# Patient Record
Sex: FEMALE | Race: WHITE | NOT HISPANIC OR LATINO | Employment: FULL TIME | URBAN - METROPOLITAN AREA
[De-identification: names, ages, dates, MRNs, and addresses within clinical notes are randomized per-mention and may not be internally consistent; named-entity substitution may affect disease eponyms.]

---

## 2017-05-04 ENCOUNTER — ALLSCRIPTS OFFICE VISIT (OUTPATIENT)
Dept: OTHER | Facility: OTHER | Age: 52
End: 2017-05-04

## 2017-09-07 DIAGNOSIS — Z12.31 ENCOUNTER FOR SCREENING MAMMOGRAM FOR MALIGNANT NEOPLASM OF BREAST: ICD-10-CM

## 2018-01-13 VITALS
HEIGHT: 63 IN | SYSTOLIC BLOOD PRESSURE: 130 MMHG | DIASTOLIC BLOOD PRESSURE: 80 MMHG | BODY MASS INDEX: 20.39 KG/M2 | WEIGHT: 115.06 LBS

## 2018-05-29 ENCOUNTER — ANNUAL EXAM (OUTPATIENT)
Dept: OBGYN CLINIC | Facility: CLINIC | Age: 53
End: 2018-05-29
Payer: COMMERCIAL

## 2018-05-29 VITALS
DIASTOLIC BLOOD PRESSURE: 80 MMHG | HEIGHT: 62 IN | BODY MASS INDEX: 21.35 KG/M2 | SYSTOLIC BLOOD PRESSURE: 122 MMHG | WEIGHT: 116 LBS

## 2018-05-29 DIAGNOSIS — Z12.31 ENCOUNTER FOR SCREENING MAMMOGRAM FOR MALIGNANT NEOPLASM OF BREAST: ICD-10-CM

## 2018-05-29 DIAGNOSIS — Z01.419 ENCOUNTER FOR GYNECOLOGICAL EXAMINATION WITHOUT ABNORMAL FINDING: Primary | ICD-10-CM

## 2018-05-29 PROBLEM — K21.9 GERD (GASTROESOPHAGEAL REFLUX DISEASE): Status: ACTIVE | Noted: 2018-05-29

## 2018-05-29 PROBLEM — K44.9 HIATAL HERNIA: Status: ACTIVE | Noted: 2018-05-08

## 2018-05-29 PROBLEM — H40.9 GLAUCOMA: Status: ACTIVE | Noted: 2018-05-29

## 2018-05-29 PROBLEM — I10 HYPERTENSION: Status: ACTIVE | Noted: 2018-05-29

## 2018-05-29 PROBLEM — J45.909 ASTHMA: Status: ACTIVE | Noted: 2018-03-16

## 2018-05-29 PROCEDURE — S0612 ANNUAL GYNECOLOGICAL EXAMINA: HCPCS | Performed by: OBSTETRICS & GYNECOLOGY

## 2018-05-29 RX ORDER — DESONIDE 0.5 MG/G
GEL TOPICAL
COMMUNITY
Start: 2018-04-26

## 2018-05-29 NOTE — PROGRESS NOTES
Assessment/Plan:    Pap due 2019  Mammo overdue  Discussed finding of cystocele on exam - not having symptoms  Discussed kegel exercises, and symptoms to be aware of  Can call with concerns or problems  Subjective:      Patient ID: Maco Powell is a 50 y o  female  Yearly exam   SAB1  LMP: 2018  Contraception: Vasectomy  PAP: 2014 neg HPV neg  Mammo: 2016 Birad #2  Colonoscopy:  as per pt neg    Fatmata Marroquin is doing well  Reports only change to her PMHx/PSHx is glaucoma, worse in her left eye  She was getting her menses regularly until Feb, and has not bled since  Having some hot flashes around when she would normally get her periods  Denies bladder or bowel problems  No breast concerns  No vaginal dryness or discharge, no pelvic pain or dyspareunia  The following portions of the patient's history were reviewed and updated as appropriate: allergies, current medications, past family history, past medical history, past social history, past surgical history and problem list     Review of Systems   Gastrointestinal: Negative for abdominal pain, constipation and diarrhea  Genitourinary: Negative for decreased urine volume, difficulty urinating, dyspareunia, menstrual problem, pelvic pain, vaginal bleeding, vaginal discharge and vaginal pain  Objective:    /80   Ht 5' 1 5" (1 562 m)   Wt 52 6 kg (116 lb)   LMP 2018   BMI 21 56 kg/m² '       Physical Exam   Constitutional: She is oriented to person, place, and time  She appears well-developed and well-nourished  No distress  Pulmonary/Chest: No respiratory distress  Right breast exhibits no inverted nipple, no mass, no nipple discharge, no skin change and no tenderness  Left breast exhibits no inverted nipple, no mass, no nipple discharge, no skin change and no tenderness  Abdominal: Soft  There is no tenderness  Genitourinary: Uterus normal  There is no rash or lesion on the right labia   There is no rash or lesion on the left labia  Uterus is not enlarged and not tender  Cervix exhibits no motion tenderness  Right adnexum displays no mass and no tenderness  Left adnexum displays no mass and no tenderness  Genitourinary Comments: Vagina: relaxed vaginal outlet, + cystocele to hymenal ring   Neurological: She is alert and oriented to person, place, and time  Skin: Skin is warm and dry  Psychiatric: She has a normal mood and affect  Vitals reviewed

## 2018-08-13 ENCOUNTER — HOSPITAL ENCOUNTER (OUTPATIENT)
Dept: RADIOLOGY | Age: 53
Discharge: HOME | End: 2018-08-13
Payer: COMMERCIAL

## 2018-08-13 DIAGNOSIS — Z12.31 ENCOUNTER FOR SCREENING MAMMOGRAM FOR MALIGNANT NEOPLASM OF BREAST: ICD-10-CM

## 2018-08-13 PROCEDURE — 77063 BREAST TOMOSYNTHESIS BI: CPT

## 2018-08-13 PROCEDURE — 77067 SCR MAMMO BI INCL CAD: CPT

## 2019-10-23 ENCOUNTER — ANNUAL EXAM (OUTPATIENT)
Dept: OBGYN CLINIC | Facility: CLINIC | Age: 54
End: 2019-10-23
Payer: COMMERCIAL

## 2019-10-23 VITALS
SYSTOLIC BLOOD PRESSURE: 134 MMHG | HEIGHT: 62 IN | BODY MASS INDEX: 21.53 KG/M2 | WEIGHT: 117 LBS | DIASTOLIC BLOOD PRESSURE: 76 MMHG

## 2019-10-23 DIAGNOSIS — Z01.419 ENCNTR FOR GYN EXAM (GENERAL) (ROUTINE) W/O ABN FINDINGS: Primary | ICD-10-CM

## 2019-10-23 DIAGNOSIS — Z12.31 ENCOUNTER FOR SCREENING MAMMOGRAM FOR MALIGNANT NEOPLASM OF BREAST: ICD-10-CM

## 2019-10-23 DIAGNOSIS — Z11.51 SCREENING FOR HUMAN PAPILLOMAVIRUS (HPV): ICD-10-CM

## 2019-10-23 DIAGNOSIS — L90.0 LICHEN SCLEROSUS ET ATROPHICUS: ICD-10-CM

## 2019-10-23 PROCEDURE — S0612 ANNUAL GYNECOLOGICAL EXAMINA: HCPCS | Performed by: OBSTETRICS & GYNECOLOGY

## 2019-10-23 PROCEDURE — G0124 SCREEN C/V THIN LAYER BY MD: HCPCS | Performed by: PATHOLOGY

## 2019-10-23 PROCEDURE — G0145 SCR C/V CYTO,THINLAYER,RESCR: HCPCS | Performed by: PATHOLOGY

## 2019-10-23 PROCEDURE — 87624 HPV HI-RISK TYP POOLED RSLT: CPT | Performed by: OBSTETRICS & GYNECOLOGY

## 2019-10-23 RX ORDER — TRAVOPROST 0.004 %
DROPS OPHTHALMIC (EYE)
Refills: 3 | COMMUNITY
Start: 2019-08-06

## 2019-10-23 RX ORDER — CLOBETASOL PROPIONATE 0.5 MG/G
CREAM TOPICAL 2 TIMES DAILY
Qty: 30 G | Refills: 0 | Status: SHIPPED | OUTPATIENT
Start: 2019-10-23 | End: 2021-03-29 | Stop reason: ALTCHOICE

## 2019-10-23 NOTE — PROGRESS NOTES
Court Gutiérrezfaheem   1965    CC:  Yearly exam    S:  48 y o  female here for yearly exam  She is still occasionally getting her period - although will go 5-6 months between them  When she does get it - not too heavy or too long  She denies vaginal discharge, itching, odor or dryness   and monogamous  Williamsjensen [place] is 16yo (son) - oldest is 17yo and in college  Still working  Sexual activity: She is sexually active without pain, bleeding or dryness  STD testing: She does not want STD testing today  Last Pap: 2014 - Normal Cytology, Negative HPV  Last Mammo: 8/3/18 - benign  Last Colonoscopy: 2010 - 10 year recall    We reviewed ASCCP guidelines for Pap testing         Current Outpatient Medications:     desonide (DESONATE) 0 05 % gel, APPLY TO HANDS EVERY DAY AS NEEDED, Disp: , Rfl:     PROAIR  (90 Base) MCG/ACT inhaler, 2 PUFFS EVERY 4 HOURS AS NEEDED  MAY USE 2 PUFFS 20-30 MINUTES BEFORE PHYSICAL EXERTION, Disp: , Rfl: 3    TRAVATAN Z 0 004 % ophthalmic solution, TAKE 1 DROP AT BEDTIME INTO BOTH EYES BRAND NECESSARY, Disp: , Rfl: 3    clobetasol (TEMOVATE) 0 05 % cream, Apply topically 2 (two) times a day, Disp: 30 g, Rfl: 0  Social History     Socioeconomic History    Marital status: /Civil Union     Spouse name: Not on file    Number of children: 3    Years of education: Not on file    Highest education level: Not on file   Occupational History    Not on file   Social Needs    Financial resource strain: Not on file    Food insecurity:     Worry: Not on file     Inability: Not on file    Transportation needs:     Medical: Not on file     Non-medical: Not on file   Tobacco Use    Smoking status: Never Smoker    Smokeless tobacco: Never Used   Substance and Sexual Activity    Alcohol use: Yes     Frequency: 2-4 times a month     Drinks per session: 1 or 2     Binge frequency: Never    Drug use: No    Sexual activity: Yes     Partners: Male     Birth control/protection: Male Sterilization   Lifestyle    Physical activity:     Days per week: Not on file     Minutes per session: Not on file    Stress: Not on file   Relationships    Social connections:     Talks on phone: Not on file     Gets together: Not on file     Attends Episcopalian service: Not on file     Active member of club or organization: Not on file     Attends meetings of clubs or organizations: Not on file     Relationship status: Not on file    Intimate partner violence:     Fear of current or ex partner: Not on file     Emotionally abused: Not on file     Physically abused: Not on file     Forced sexual activity: Not on file   Other Topics Concern    Not on file   Social History Narrative    Always uses seat belt    Caffeine use    No preference on Episcopalian beliefs     Partner had vasectomy      Family History   Problem Relation Age of Onset    Diabetes Mother     Osteoporosis Mother     Lung cancer Father     Skin cancer Brother     Breast cancer Maternal Aunt      Past Medical History:   Diagnosis Date    Asthma     Cyst, breast, unspecified laterality     [place]   3/20/14   [place]  Jorge Alberto Cha 4/11/16    Fibroadenoma of breast     Premenopausal menorrhagia     [place]  Jorge Alberto Cha 2/17/14   [place]    4/7/15         Review of Systems   Respiratory: Negative  Cardiovascular: Negative  Gastrointestinal: Negative for constipation and diarrhea  Genitourinary: Negative for difficulty urinating, pelvic pain, vaginal bleeding, vaginal discharge, itching or odor  O:  Blood pressure 134/76, height 5' 2" (1 575 m), weight 53 1 kg (117 lb), last menstrual period 09/16/2019  Patient appears well and is not in distress  Neck is supple without masses  Breasts are symmetrical without mass, tenderness, nipple discharge, skin changes or adenopathy  Abdomen is soft and nontender without masses     External genitals + white discoloration, loss normal anatomy near clitoris  Urethral meatus and urethra are normal  Bladder is normal to palpation  Vagina is + cystocele   Cervix is normal without discharge or lesion  Uterus is normal, mobile, nontender without palpable mass  Adnexa are normal, nontender, without palpable mass  A:  Yearly exam      P:   Pap & HPV collected  Mammo slip given   Colonoscopy due 2285   Lichen Sclerosis - patient does admit to occasional itching, discussed use clobetasol   Cystocele asymptomatic  RTO one year for yearly exam or sooner as needed

## 2019-10-25 LAB
HPV HR 12 DNA CVX QL NAA+PROBE: NEGATIVE
HPV16 DNA CVX QL NAA+PROBE: NEGATIVE
HPV18 DNA CVX QL NAA+PROBE: NEGATIVE

## 2019-10-30 LAB
LAB AP GYN PRIMARY INTERPRETATION: NORMAL
LAB AP LMP: NORMAL
Lab: NORMAL
PATH INTERP SPEC-IMP: NORMAL

## 2021-03-25 ENCOUNTER — PREPPED CHART (OUTPATIENT)
Dept: URBAN - METROPOLITAN AREA CLINIC 6 | Facility: CLINIC | Age: 56
End: 2021-03-25

## 2021-03-29 ENCOUNTER — ANNUAL EXAM (OUTPATIENT)
Dept: OBGYN CLINIC | Facility: CLINIC | Age: 56
End: 2021-03-29
Payer: COMMERCIAL

## 2021-03-29 VITALS
HEIGHT: 62 IN | WEIGHT: 123 LBS | SYSTOLIC BLOOD PRESSURE: 140 MMHG | DIASTOLIC BLOOD PRESSURE: 64 MMHG | BODY MASS INDEX: 22.63 KG/M2

## 2021-03-29 DIAGNOSIS — Z01.419 ENCOUNTER FOR GYNECOLOGICAL EXAMINATION WITHOUT ABNORMAL FINDING: Primary | ICD-10-CM

## 2021-03-29 DIAGNOSIS — Z12.31 ENCOUNTER FOR SCREENING MAMMOGRAM FOR MALIGNANT NEOPLASM OF BREAST: ICD-10-CM

## 2021-03-29 PROCEDURE — 99396 PREV VISIT EST AGE 40-64: CPT | Performed by: OBSTETRICS & GYNECOLOGY

## 2021-03-29 RX ORDER — BRIMONIDINE TARTRATE 0.1 %
DROPS OPHTHALMIC (EYE)
COMMUNITY
Start: 2021-03-25

## 2021-03-29 RX ORDER — LOSARTAN POTASSIUM 25 MG/1
12.5 TABLET ORAL DAILY
COMMUNITY
Start: 2021-02-05

## 2021-03-29 NOTE — PROGRESS NOTES
Damaris Martínez  1965      CC:  Yearly exam    S:  50 y o  female here for yearly exam  LMP October 2020  Prior to that was maybe April  Reviewed definitions of menopause, and when bleeding would become irregular  No urinary, bowel, breast concerns  Diagnosed with lichen last visit - has not had any flares/issues with this  Family has been healthy  She has gotten her first dose of the COVID vaccine! Since last visit was started on Losartan for HTN  Sexual activity: She is sexually active without pain, bleeding or dryness  Contraception: She uses vasectomy  for contraception  Last Pap 10/23/19 - Normal, Neg HPV  Last Mammo  8/13/18 - 3D, benign  Last Colonosocpy - has not had, but has referral     We reviewed ASCCP guidelines for Pap testing today  Family hx of breast cancer: M   Aunt  Family hx of ovarian cancer: no  Family hx of colon cancer: no      Current Outpatient Medications:     Alphagan P 0 1 %, , Disp: , Rfl:     desonide (DESONATE) 0 05 % gel, APPLY TO HANDS EVERY DAY AS NEEDED, Disp: , Rfl:     losartan (COZAAR) 25 mg tablet, Take 12 5 mg by mouth daily, Disp: , Rfl:     PROAIR  (90 Base) MCG/ACT inhaler, 2 PUFFS EVERY 4 HOURS AS NEEDED  MAY USE 2 PUFFS 20-30 MINUTES BEFORE PHYSICAL EXERTION, Disp: , Rfl: 3    TRAVATAN Z 0 004 % ophthalmic solution, TAKE 1 DROP AT BEDTIME INTO BOTH EYES BRAND NECESSARY, Disp: , Rfl: 3    clobetasol (TEMOVATE) 0 05 % cream, Apply topically 2 (two) times a day (Patient not taking: Reported on 3/29/2021), Disp: 30 g, Rfl: 0  Social History     Socioeconomic History    Marital status: /Civil Union     Spouse name: Not on file    Number of children: 3    Years of education: Not on file    Highest education level: Not on file   Occupational History    Not on file   Social Needs    Financial resource strain: Not on file    Food insecurity     Worry: Not on file     Inability: Not on file   [employer] needs Medical: Not on file     Non-medical: Not on file   Tobacco Use    Smoking status: Never Smoker    Smokeless tobacco: Never Used   Substance and Sexual Activity    Alcohol use: Yes     Frequency: 2-4 times a month     Drinks per session: 1 or 2     Binge frequency: Never    Drug use: No    Sexual activity: Yes     Partners: Male     Birth control/protection: Male Sterilization   Lifestyle    Physical activity     Days per week: Not on file     Minutes per session: Not on file    Stress: Not on file   Relationships    Social connections     Talks on phone: Not on file     Gets together: Not on file     Attends Mormonism service: Not on file     Active member of club or organization: Not on file     Attends meetings of clubs or organizations: Not on file     Relationship status: Not on file    Intimate partner violence     Fear of current or ex partner: Not on file     Emotionally abused: Not on file     Physically abused: Not on file     Forced sexual activity: Not on file   Other Topics Concern    Not on file   Social History Narrative    Always uses seat belt    Caffeine use    No preference on Mormonism beliefs     Partner had vasectomy      Family History   Problem Relation Age of Onset    Diabetes Mother     Osteoporosis Mother     Lung cancer Father     Skin cancer Brother     Breast cancer Maternal Aunt     Heart attack Sister     Heart attack Brother       Past Medical History:   Diagnosis Date    Asthma     Cyst, breast, unspecified laterality     [place]   3/20/14   [place]  Francella Crooked Francella Crooked 4/11/16    Fibroadenoma of breast     Hypertension     Premenopausal menorrhagia     [place]  Francella Crooked Francella Crooked 2/17/14   [place]    4/7/15         Review of Systems   Respiratory: Negative  Cardiovascular: Negative  Gastrointestinal: Negative for constipation and diarrhea  Genitourinary: Negative for difficulty urinating, pelvic pain, vaginal bleeding, vaginal discharge, itching or odor      O:  Blood pressure 140/64, height 5' 2" (1 575 m), weight 55 8 kg (123 lb)  Patient appears well and is not in distress  Breasts are symmetrical without mass, tenderness, nipple discharge, skin changes or adenopathy  Abdomen is soft and nontender without masses  External genitals  - white thinning, loss normal architecture around clitoris  Urethral meatus and urethra are normal  Bladder is normal to palpation  Vagina + cystocele, mild atrophy  Cervix is normal without discharge or lesion  Uterus is normal, mobile, nontender without palpable mass  Adnexa are normal, nontender, without palpable mass  A:  Yearly exam      P:   Pap up to date     Mammo slip provided    RTO one year for yearly exam or sooner as needed

## 2021-04-08 DIAGNOSIS — Z23 ENCOUNTER FOR IMMUNIZATION: ICD-10-CM

## 2021-11-05 ENCOUNTER — IOP CHECK (OUTPATIENT)
Dept: URBAN - METROPOLITAN AREA CLINIC 6 | Facility: CLINIC | Age: 56
End: 2021-11-05

## 2021-11-05 DIAGNOSIS — H25.813: ICD-10-CM

## 2021-11-05 DIAGNOSIS — H04.123: ICD-10-CM

## 2021-11-05 DIAGNOSIS — H11.041: ICD-10-CM

## 2021-11-05 DIAGNOSIS — H40.1230: ICD-10-CM

## 2021-11-05 PROCEDURE — G8427 DOCREV CUR MEDS BY ELIG CLIN: HCPCS

## 2021-11-05 PROCEDURE — 92083 EXTENDED VISUAL FIELD XM: CPT

## 2021-11-05 PROCEDURE — 1036F TOBACCO NON-USER: CPT

## 2021-11-05 PROCEDURE — 92012 INTRM OPH EXAM EST PATIENT: CPT

## 2021-11-05 ASSESSMENT — VISUAL ACUITY
OS_CC: 20/25+2
OU_CC: J1
OD_CC: 20/25+2

## 2021-11-05 ASSESSMENT — TONOMETRY
OD_IOP_MMHG: 14
OD_IOP_MMHG: 14
OS_IOP_MMHG: 16
OS_IOP_MMHG: 14

## 2021-11-17 ENCOUNTER — TELEPHONE (OUTPATIENT)
Dept: OBGYN CLINIC | Facility: CLINIC | Age: 56
End: 2021-11-17

## 2023-10-01 ENCOUNTER — HOSPITAL ENCOUNTER (EMERGENCY)
Facility: HOSPITAL | Age: 58
Discharge: HOME | End: 2023-10-01
Attending: EMERGENCY MEDICINE
Payer: COMMERCIAL

## 2023-10-01 VITALS
RESPIRATION RATE: 18 BRPM | HEART RATE: 65 BPM | TEMPERATURE: 98.7 F | SYSTOLIC BLOOD PRESSURE: 154 MMHG | DIASTOLIC BLOOD PRESSURE: 74 MMHG | OXYGEN SATURATION: 100 %

## 2023-10-01 DIAGNOSIS — T63.441A ALLERGIC REACTION TO BEE STING: Primary | ICD-10-CM

## 2023-10-01 PROCEDURE — 99284 EMERGENCY DEPT VISIT MOD MDM: CPT | Performed by: EMERGENCY MEDICINE

## 2023-10-01 PROCEDURE — 99282 EMERGENCY DEPT VISIT SF MDM: CPT

## 2023-10-01 PROCEDURE — 96375 TX/PRO/DX INJ NEW DRUG ADDON: CPT

## 2023-10-01 PROCEDURE — 96374 THER/PROPH/DIAG INJ IV PUSH: CPT

## 2023-10-01 RX ORDER — LORATADINE 10 MG/1
10 TABLET ORAL 2 TIMES DAILY PRN
Qty: 20 TABLET | Refills: 0 | Status: SHIPPED | OUTPATIENT
Start: 2023-10-01

## 2023-10-01 RX ORDER — EPINEPHRINE 0.3 MG/.3ML
0.3 INJECTION SUBCUTANEOUS ONCE
Qty: 0.6 ML | Refills: 0 | Status: SHIPPED | OUTPATIENT
Start: 2023-10-01 | End: 2023-10-01

## 2023-10-01 RX ORDER — FAMOTIDINE 10 MG/ML
20 INJECTION, SOLUTION INTRAVENOUS ONCE
Status: COMPLETED | OUTPATIENT
Start: 2023-10-01 | End: 2023-10-01

## 2023-10-01 RX ORDER — KETOROLAC TROMETHAMINE 30 MG/ML
15 INJECTION, SOLUTION INTRAMUSCULAR; INTRAVENOUS ONCE
Status: COMPLETED | OUTPATIENT
Start: 2023-10-01 | End: 2023-10-01

## 2023-10-01 RX ORDER — DEXAMETHASONE SODIUM PHOSPHATE 10 MG/ML
8 INJECTION, SOLUTION INTRAMUSCULAR; INTRAVENOUS ONCE
Status: COMPLETED | OUTPATIENT
Start: 2023-10-01 | End: 2023-10-01

## 2023-10-01 RX ORDER — LORATADINE 10 MG/1
10 TABLET ORAL ONCE
Status: COMPLETED | OUTPATIENT
Start: 2023-10-01 | End: 2023-10-01

## 2023-10-01 RX ADMIN — FAMOTIDINE 20 MG: 10 INJECTION, SOLUTION INTRAVENOUS at 14:46

## 2023-10-01 RX ADMIN — LORATADINE 10 MG: 10 TABLET ORAL at 14:45

## 2023-10-01 RX ADMIN — DEXAMETHASONE SODIUM PHOSPHATE 8 MG: 10 INJECTION, SOLUTION INTRAMUSCULAR; INTRAVENOUS at 14:49

## 2023-10-01 RX ADMIN — KETOROLAC TROMETHAMINE 15 MG: 30 INJECTION, SOLUTION INTRAMUSCULAR; INTRAVENOUS at 15:21

## 2023-10-01 NOTE — DISCHARGE INSTRUCTIONS
Use the Claritin up to twice daily as needed for allergic reaction, hives. Come back to the emergency department for signs of anaphylaxis such as feeling like your throat is closing, increasing shortness of breath, nausea, vomiting, diarrhea.

## 2023-10-01 NOTE — ED PROVIDER NOTES
History  Chief Complaint   Patient presents with   • Bee Sting     Pt reports bee sting on left arm 15-20 min ago, pt reports she is allergic to bees, no meds PTA. Pt feels SOB     61-year-old female previous history of asthma, hypertension, allergy to bee stings presenting to the emergency department for a bee sting. Patient reports that a bee landed on her left arm approximately 30 minutes ago. Stung her on the back of her arm. She notes pain located at the site of the sting. She notes dyspnea. She denies wheezing. Denies nausea, vomiting, abdominal pain. She reports previous stings have caused her to break out into hives and then turn purple. She has previously been prescribed epinephrine pens but notes that they are too expensive now so she does not have them. Doesn't appear to have hx of anaphylaxis and has not needed epi injection previously. Insect Bite  Attacking animal: bee. Animal bite location: L arm. Pain details:     Quality:  Aching    Severity:  Mild    Timing:  Constant    Progression:  Unchanged  Provoked: unprovoked    Animal in possession: no    Relieved by:  Nothing  Ineffective treatments:  None tried      Prior to Admission Medications   Prescriptions Last Dose Informant Patient Reported? Taking? Alphagan P 0.1 %   Yes No   PROAIR  (90 Base) MCG/ACT inhaler   Yes No   Si PUFFS EVERY 4 HOURS AS NEEDED. MAY USE 2 PUFFS 20-30 MINUTES BEFORE PHYSICAL EXERTION   TRAVATAN Z 0.004 % ophthalmic solution   Yes No   Sig: TAKE 1 DROP AT BEDTIME INTO BOTH EYES BRAND NECESSARY   desonide (DESONATE) 0.05 % gel   Yes No   Sig: APPLY TO HANDS EVERY DAY AS NEEDED   losartan (COZAAR) 25 mg tablet   Yes No   Sig: Take 12.5 mg by mouth daily      Facility-Administered Medications: None       Past Medical History:   Diagnosis Date   • Asthma    • Cyst, breast, unspecified laterality     [place]. ...3/20/14   R. Tilmon Fluke Tilmon Fluke 16   • Fibroadenoma of breast    • Hypertension    • Premenopausal menorrhagia     [place]. Kely Patel 14   [place]. .. 4/7/15        Past Surgical History:   Procedure Laterality Date   • BREAST BIOPSY      percutaneous needle core    • CHOLECYSTECTOMY     • COLONOSCOPY     • THERAPEUTIC       surgical treatment of missed        Family History   Problem Relation Age of Onset   • Diabetes Mother    • Osteoporosis Mother    • Lung cancer Father    • Skin cancer Brother    • Breast cancer Maternal Aunt    • Heart attack Sister    • Heart attack Brother      I have reviewed and agree with the history as documented. E-Cigarette/Vaping   • E-Cigarette Use Never User      E-Cigarette/Vaping Substances   • Nicotine No    • THC No    • CBD No    • Flavoring No      Social History     Tobacco Use   • Smoking status: Never   • Smokeless tobacco: Never   Vaping Use   • Vaping Use: Never used   Substance Use Topics   • Alcohol use: Yes   • Drug use: No       Review of Systems   Respiratory: Positive for shortness of breath. All other systems reviewed and are negative. Physical Exam  Physical Exam  Vitals and nursing note reviewed. Constitutional:       General: She is not in acute distress. Appearance: Normal appearance. She is not ill-appearing. HENT:      Head: Normocephalic and atraumatic. Right Ear: External ear normal.      Left Ear: External ear normal.      Nose: Nose normal.      Mouth/Throat:      Mouth: Mucous membranes are moist.   Eyes:      General:         Right eye: No discharge. Left eye: No discharge. Conjunctiva/sclera: Conjunctivae normal.   Cardiovascular:      Rate and Rhythm: Normal rate and regular rhythm. Pulses: Normal pulses. Heart sounds: No murmur heard. Pulmonary:      Effort: Pulmonary effort is normal.      Breath sounds: Normal breath sounds. Abdominal:      General: Abdomen is flat. There is no distension. Tenderness: There is no abdominal tenderness.    Musculoskeletal:         General: Normal range of motion. Cervical back: Normal range of motion. Skin:     General: Skin is warm. Capillary Refill: Capillary refill takes less than 2 seconds. Findings: No rash. Comments: Localized area of irritation on the left posterior upper arm. No stinger attached. No surrounding erythema. No welts. Neurological:      General: No focal deficit present. Mental Status: She is alert. Mental status is at baseline. Psychiatric:         Mood and Affect: Mood normal.         Behavior: Behavior normal.         Vital Signs  ED Triage Vitals   Temperature Pulse Respirations Blood Pressure SpO2   10/01/23 1422 10/01/23 1422 10/01/23 1422 10/01/23 1422 10/01/23 1422   98.7 °F (37.1 °C) 101 22 154/74 99 %      Temp Source Heart Rate Source Patient Position - Orthostatic VS BP Location FiO2 (%)   10/01/23 1422 10/01/23 1515 -- -- --   Oral Monitor         Pain Score       10/01/23 1521       4           Vitals:    10/01/23 1422 10/01/23 1515   BP: 154/74    Pulse: 101 65         Visual Acuity      ED Medications  Medications   Famotidine (PF) (PEPCID) injection 20 mg (20 mg Intravenous Given 10/1/23 1446)   dexamethasone (PF) (DECADRON) injection 8 mg (8 mg Intravenous Given 10/1/23 1449)   loratadine (CLARITIN) tablet 10 mg (10 mg Oral Given 10/1/23 1445)   ketorolac (TORADOL) injection 15 mg (15 mg Intravenous Given 10/1/23 1521)       Diagnostic Studies  Results Reviewed     None                 No orders to display              Procedures  Procedures         ED Course                                             Medical Decision Making  Patient with bee sting. No signs of anaphylaxis on exam though she notes dyspnea. She is not wheezing. Her oxygen saturation is normal.    We will treat with antihistamines, steroids. Will reassess and monitor for 2 hours    Allergic reaction to bee sting: acute illness or injury  Risk  OTC drugs. Prescription drug management.           Disposition  Final diagnoses: Allergic reaction to bee sting     Time reflects when diagnosis was documented in both MDM as applicable and the Disposition within this note     Time User Action Codes Description Comment    10/1/2023  4:14 PM Sushma Comment Add [O94.698S] Allergic reaction to bee sting       ED Disposition     ED Disposition   Discharge    Condition   Stable    Date/Time   Sun Oct 1, 2023  4:14 PM    Victorino Alarcon discharge to home/self care. Follow-up Information     Follow up With Specialties Details Why [id number] [facility], [address]  For re-evaluation as soon as possible [address]  [facility] [address] [facility] Emergency Department Emergency Medicine  If symptoms worsen [address] [id number]  [address] [facility] Emergency Department, [address] [place] [address], [address] [facility]          Discharge Medication List as of 10/1/2023  4:15 PM      START taking these medications    Details   EPINEPHrine (EPIPEN) 0.3 mg/0.3 mL SOAJ Inject 0.3 mL (0.3 mg total) into a muscle once for 1 dose Dispense 2 pens, Starting Sun 10/1/2023, Normal      loratadine (CLARITIN) 10 mg tablet Take 1 tablet (10 mg total) by mouth 2 (two) times a day as needed (itching hives), Starting Sun 10/1/2023, Normal         CONTINUE these medications which have NOT CHANGED    Details   Alphagan P 0.1 % Starting Thu 3/25/2021, Historical Med      desonide (DESONATE) 0.05 % gel APPLY TO HANDS EVERY DAY AS NEEDED, Historical Med      losartan (COZAAR) 25 mg tablet Take 12.5 mg by mouth daily, Starting Fri 2/5/2021, Historical Med      PROAIR  (90 Base) MCG/ACT inhaler 2 PUFFS EVERY 4 HOURS AS NEEDED. MAY USE 2 PUFFS 20-30 MINUTES BEFORE PHYSICAL EXERTION, Historical Med      TRAVATAN Z 0.004 % ophthalmic solution TAKE 1 DROP AT BEDTIME INTO BOTH EYES BRAND NECESSARY, Historical Med             No discharge procedures on file.     PDMP Review     None          ED Provider  Electronically Signed by           Tone Terry DO  10/01/23 [id number]